# Patient Record
Sex: FEMALE | Race: OTHER | HISPANIC OR LATINO | Employment: FULL TIME | URBAN - METROPOLITAN AREA
[De-identification: names, ages, dates, MRNs, and addresses within clinical notes are randomized per-mention and may not be internally consistent; named-entity substitution may affect disease eponyms.]

---

## 2017-05-16 ENCOUNTER — OFFICE VISIT (OUTPATIENT)
Dept: URGENT CARE | Facility: MEDICAL CENTER | Age: 52
End: 2017-05-16
Payer: COMMERCIAL

## 2017-05-16 PROCEDURE — 99213 OFFICE O/P EST LOW 20 MIN: CPT

## 2019-12-23 ENCOUNTER — OFFICE VISIT (OUTPATIENT)
Dept: URGENT CARE | Facility: MEDICAL CENTER | Age: 54
End: 2019-12-23
Payer: COMMERCIAL

## 2019-12-23 VITALS
SYSTOLIC BLOOD PRESSURE: 151 MMHG | DIASTOLIC BLOOD PRESSURE: 82 MMHG | BODY MASS INDEX: 31.65 KG/M2 | HEIGHT: 65 IN | HEART RATE: 104 BPM | OXYGEN SATURATION: 97 % | RESPIRATION RATE: 18 BRPM | TEMPERATURE: 99.4 F | WEIGHT: 190 LBS

## 2019-12-23 DIAGNOSIS — J02.9 PHARYNGITIS, UNSPECIFIED ETIOLOGY: Primary | ICD-10-CM

## 2019-12-23 LAB — S PYO AG THROAT QL: NEGATIVE

## 2019-12-23 PROCEDURE — 87070 CULTURE OTHR SPECIMN AEROBIC: CPT | Performed by: PHYSICIAN ASSISTANT

## 2019-12-23 PROCEDURE — 99213 OFFICE O/P EST LOW 20 MIN: CPT | Performed by: PHYSICIAN ASSISTANT

## 2019-12-23 PROCEDURE — 87880 STREP A ASSAY W/OPTIC: CPT | Performed by: PHYSICIAN ASSISTANT

## 2019-12-23 RX ORDER — FLUTICASONE PROPIONATE 50 MCG
2 SPRAY, SUSPENSION (ML) NASAL DAILY
Qty: 1 BOTTLE | Refills: 0 | Status: SHIPPED | OUTPATIENT
Start: 2019-12-23

## 2019-12-23 NOTE — PROGRESS NOTES
[facility]        NAME: Anjana Escobar is a 62 y o  female  : 1965    MRN: [id number]  DATE: 2019  TIME: 12:25 PM    Assessment and Plan   Pharyngitis, unspecified etiology [J02 9]  1  Pharyngitis, unspecified etiology  POCT rapid strepA    fluticasone (FLONASE) 50 mcg/act nasal spray    Throat culture         Patient Instructions     Rapid strep test in the office here was negative  Culture will be sent out and if positive antibiotics will be sent into your pharamacy  Began with Flonase 2 sprays in each nostril daily  Continue with supportive care:  Warm saltwater gargles, warm humidified air ,saline nasal spray, cough drops as needed to help symptoms  Continue to use over-the-counter decongestants and allergy medication as needed  Follow-up with PCP in 3-5 days or sooner if needed  Proceed to the ER if symptoms acutely worsen, difficulties breathing, chest pain, difficulties swelling or tolerating oral intake  Chief Complaint     Chief Complaint   Patient presents with    Sore Throat     Patient c/o post nasal drip , sore throat , and fever x 2 days          History of Present Illness       62year old female, with no significant past medical history presents with sore throat and cold like symptoms x 3 days  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  Sore Throat    This is a new problem  Episode onset: x 3 days  The problem has been gradually improving  Neither side of throat is experiencing more pain than the other  The maximum temperature recorded prior to her arrival was 101 - 101 9 F (fever on saturday )  The fever has been present for less than 1 day (Saturday )  The pain is at a severity of 3/10 (worse with swallowing, still able to do this however)  The pain is mild   Associated symptoms include congestion, coughing (productive- green ), ear pain ("blocked"), headaches ( occasional sinus), a hoarse voice and a plugged ear sensation (right )  Pertinent negatives include no abdominal pain, diarrhea, drooling, ear discharge, shortness of breath, stridor, trouble swallowing or vomiting  Associated symptoms comments: " working harder to breathe"   Exposure to: unsure   She has tried NSAIDs (OTC sudefed, claritin, mucinex, advil ) for the symptoms  The treatment provided mild relief  Patient denies medical history of respiratory diagnosis,and has never been hospitalized for respiratory illness  She does state that she month had pleurisy  Patient is up-to-date on her vaccines as far she knows  Review of Systems   Review of Systems   Constitutional: Positive for chills, fatigue and fever (tmax 101 0)  HENT: Positive for congestion, ear pain ("blocked"), hoarse voice, postnasal drip, rhinorrhea, sinus pressure and sore throat  Negative for drooling, ear discharge, hearing loss, trouble swallowing and voice change  Eyes: Negative for pain and visual disturbance  Respiratory: Positive for cough (productive- green ) and chest tightness  Negative for shortness of breath and stridor  Cardiovascular: Negative for chest pain and palpitations  Gastrointestinal: Positive for nausea (X1 episode with fever on Saturday)  Negative for abdominal pain, constipation, diarrhea and vomiting  Genitourinary: Negative for difficulty urinating, dysuria and hematuria  Musculoskeletal: Negative for arthralgias and myalgias  Skin: Negative for rash  Neurological: Positive for headaches ( occasional sinus)           Current Medications       Current Outpatient Medications:     Cholecalciferol 25 MCG (1000 UT) CHEW, Chew, Disp: , Rfl:     Ferrous Sulfate Dried (FERROUS SULFATE CR PO), Take by mouth, Disp: , Rfl:     fluticasone (FLONASE) 50 mcg/act nasal spray, 2 sprays into each nostril daily, Disp: 1 Bottle, Rfl: 0    Multiple Vitamins-Minerals (MULTIVITAMIN ADULT PO), Take 1 tablet by mouth daily, Disp: , Rfl: Current Allergies     Allergies as of 12/23/2019 - never reviewed   Allergen Reaction Noted    Levofloxacin Other (See Comments) and Rash 06/16/2011    [place] flavor  06/16/2011            The following portions of the patient's history were reviewed and updated as appropriate: allergies, current medications, past family history, past medical history, past social history, past surgical history and problem list      No past medical history on file  No past surgical history on file  No family history on file  Medications have been verified  Objective   /82   Pulse 104   Temp 99 4 °F (37 4 °C)   Resp 18   Ht 5' 5" (1 651 m)   Wt 86 2 kg (190 lb)   SpO2 97%   BMI 31 62 kg/m²        Physical Exam     Physical Exam   Constitutional: Vital signs are normal  She appears well-developed and well-nourished  She is cooperative  Non-toxic appearance  She does not have a sickly appearance  She does not appear ill  No distress  HENT:   Head: Normocephalic and atraumatic  Right Ear: Hearing, external ear and ear canal normal  Tympanic membrane is not injected and not erythematous  A middle ear effusion is present  Left Ear: Hearing, external ear and ear canal normal  Tympanic membrane is not injected and not erythematous  A middle ear effusion is present  Nose: Mucosal edema and rhinorrhea present  Right sinus exhibits frontal sinus tenderness  Right sinus exhibits no maxillary sinus tenderness  Left sinus exhibits frontal sinus tenderness  Left sinus exhibits no maxillary sinus tenderness  Mouth/Throat: Uvula is midline and mucous membranes are normal  No uvula swelling  Posterior oropharyngeal erythema present  No oropharyngeal exudate or posterior oropharyngeal edema  Eyes: Pupils are equal, round, and reactive to light  Conjunctivae, EOM and lids are normal  Right conjunctiva is not injected  Left conjunctiva is not injected   Right eye exhibits normal extraocular motion and no nystagmus  Left eye exhibits normal extraocular motion and no nystagmus  Neck: Normal range of motion  Neck supple  No tracheal deviation present  Cardiovascular: Normal rate, regular rhythm, S1 normal, S2 normal and normal heart sounds  No murmur heard  Pulmonary/Chest: Effort normal and breath sounds normal  No stridor  No respiratory distress  She has no decreased breath sounds  She has no wheezes  She has no rales  Abdominal: Soft  Bowel sounds are normal  There is no tenderness  Lymphadenopathy:     She has no cervical adenopathy  Neurological: She is alert  Skin: Skin is warm and dry  No rash noted  She is not diaphoretic  Psychiatric: She has a normal mood and affect

## 2019-12-23 NOTE — PATIENT INSTRUCTIONS
Rapid strep test in the office here was negative  Culture will be sent out and if positive antibiotics will be sent into your pharamacy  Began with Flonase 2 sprays in each nostril daily  Continue with supportive care:  Warm saltwater gargles, warm humidified air ,saline nasal spray, cough drops as needed to help symptoms  Continue to use over-the-counter decongestants and allergy medication as needed  Follow-up with PCP in 3-5 days or sooner if needed  Proceed to the ER if symptoms acutely worsen, difficulties breathing, chest pain, difficulties swelling or tolerating oral intake  Cold Symptoms   WHAT YOU NEED TO KNOW:   A cold is an infection caused by a virus  The infection causes your upper respiratory system to become inflamed  Common symptoms of a cold include sneezing, dry throat, a stuffy nose, headache, watery eyes, and a cough  Your cough may be dry, or you may cough up mucus  You may also have muscle aches, joint pain, and tiredness  Rarely, you may have a fever  Most colds go away without treatment  DISCHARGE INSTRUCTIONS:   Return to the emergency department if:   · You have increased tiredness and weakness  · You are unable to eat  · Your heart is beating much faster than usual for you  · You see white spots in the back of your throat and your neck is swollen and sore to the touch  · You see pinpoint or larger reddish-purple dots on your skin  Contact your healthcare provider if:   · You have a fever higher than 102°F (38 9°C)  · You have new or worsening shortness of breath  · You have thick nasal drainage for more than 2 days  · Your symptoms do not improve or get worse within 5 days  · You have questions or concerns about your condition or care  Medicines: The following medicines may be suggested by your healthcare provider to decrease your cold symptoms  These medicines are available without a doctor's order   Ask which medicines to take and when to take them  Follow directions  · NSAIDs or acetaminophen  help to bring down a fever or decrease pain  · Decongestants  help decrease nasal stuffiness  · Antihistamines  help decrease sneezing and a runny nose  · Cough suppressants  help decrease how much you cough  · Expectorants  help loosen mucus so you can cough it up  · Take your medicine as directed  Contact your healthcare provider if you think your medicine is not helping or if you have side effects  Tell him of her if you are allergic to any medicine  Keep a list of the medicines, vitamins, and herbs you take  Include the amounts, and when and why you take them  Bring the list or the pill bottles to follow-up visits  Carry your medicine list with you in case of an emergency  Symptom relief: The following may help relieve cold symptoms, such as a dry throat and congestion:  · Gargle with mouthwash or warm salt water as directed  · Suck on throat lozenges or hard candy  · Use a cold or warm vaporizer or humidifier to ease your breathing  · Rest for at least 2 days and then as needed to decrease tiredness and weakness  · Use petroleum based jelly around your nostrils to decrease irritation from blowing your nose  Drink liquids:  Liquids will help thin and loosen thick mucus so you can cough it up  Liquids will also keep you hydrated  Ask your healthcare provider which liquids are best for you and how much to drink each day  Prevent the spread of germs: You can spread your cold germs to others for at least 3 days after your symptoms start  Wash your hands often  Do not share items, such as eating utensils  Cover your nose and mouth when you cough or sneeze using the crook of your elbow instead of your hands  Throw used tissues in the garbage  Do not smoke:  Smoking may worsen your symptoms and increase the length of time you feel sick  Talk with your healthcare provider if you need help to stop smoking    Follow up with your healthcare provider as directed:  Write down your questions so you remember to ask them during your visits  © 2017 [employer] Information is for End User's use only and may not be sold, redistributed or otherwise used for commercial purposes  All illustrations and images included in CareNotes® are the copyrighted property of [employer]  or Shyam Valentin  The above information is an  only  It is not intended as medical advice for individual conditions or treatments  Talk to your doctor, nurse or pharmacist before following any medical regimen to see if it is safe and effective for you

## 2019-12-25 LAB — BACTERIA THROAT CULT: NORMAL

## 2021-03-26 DIAGNOSIS — Z23 ENCOUNTER FOR IMMUNIZATION: ICD-10-CM

## 2024-11-14 ENCOUNTER — TELEPHONE (OUTPATIENT)
Age: 59
End: 2024-11-14

## 2024-11-14 NOTE — TELEPHONE ENCOUNTER
APPOINTMENTS MUST BE SCHEDULED A MINIMUM OF 14 DAYS PRIOR TO LEAVING FOR THEIR TRIP:??????   When are you traveling?  2/7/2025  Where specifically are you traveling to and for how long? (Must include entering country to leaving country) [place] / 1 week   Are you scheduling this appointment for just you or a group of people?? If scheduling for a group, how many?? (Maximum of 6 individuals, more than 6 send message to office for approval) 1 traveler   Have you ever been seen in our office before??No  If yes, record must be found within patients’ chart  For our records: What pharmacy do you use?  [facility]   General Information:  Please be advised that we do not accept insurance for these visits. It will be out of pocket.  Payment is collected after your appointment with the physician and is based on his individual recommendations and what you receive here in the office.  Our provider will only offer what is required/highly recommended for your trip.  Previous patients who were already seen and only require malaria prophylaxis do not need scheduled visit. Send the medication request to the office for refill.  We do not carry many of the immunizations within our office due to how expensive they can be. Please note: immunizations will most likely need to be ordered and take up to two to three days to come in. We will call you when they arrive.  If you are being seen in a group: Please arrive 15 minutes prior to the earliest appointment time as you will be seen together.  Groups with 3 or more people:  Schedule 15-minute appointment for each patient at the end of the day.  If patient is 12 years old or younger:  Always book patient at the end of the day. Use more than one time slot as necessary.    - Please bring your itinerary with you and your yellow CDC card, if you have one. If you do not have a card, we will provide you with one.    - Please be aware that when you receive your vaccinations, you will be  asked to remain in the office for 15 minutes prior to getting your vaccine.